# Patient Record
Sex: FEMALE | Race: WHITE | NOT HISPANIC OR LATINO | Employment: FULL TIME | URBAN - METROPOLITAN AREA
[De-identification: names, ages, dates, MRNs, and addresses within clinical notes are randomized per-mention and may not be internally consistent; named-entity substitution may affect disease eponyms.]

---

## 2024-01-24 ENCOUNTER — OFFICE VISIT (OUTPATIENT)
Dept: URGENT CARE | Facility: CLINIC | Age: 54
End: 2024-01-24
Payer: OTHER MISCELLANEOUS

## 2024-01-24 VITALS
OXYGEN SATURATION: 96 % | TEMPERATURE: 98 F | DIASTOLIC BLOOD PRESSURE: 115 MMHG | RESPIRATION RATE: 16 BRPM | HEART RATE: 82 BPM | SYSTOLIC BLOOD PRESSURE: 165 MMHG

## 2024-01-24 DIAGNOSIS — Z02.83 ENCOUNTER FOR DRUG SCREENING: ICD-10-CM

## 2024-01-24 DIAGNOSIS — S59.911A INJURY OF RIGHT FOREARM, INITIAL ENCOUNTER: Primary | ICD-10-CM

## 2024-01-24 DIAGNOSIS — Z02.6 ENCOUNTER RELATED TO WORKER'S COMPENSATION CLAIM: ICD-10-CM
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
## 2024-01-24 LAB
BREATH ALCOHOL: 0
CTP QC/QA: YES
POC 10 PANEL DRUG SCREEN: NEGATIVE

## 2024-01-24 PROCEDURE — 80305 DRUG TEST PRSMV DIR OPT OBS: CPT | Mod: S$GLB,,, | Performed by: FAMILY MEDICINE

## 2024-01-24 PROCEDURE — 73090 X-RAY EXAM OF FOREARM: CPT | Mod: RT,S$GLB,, | Performed by: RADIOLOGY

## 2024-01-24 PROCEDURE — 99203 OFFICE O/P NEW LOW 30 MIN: CPT | Mod: S$GLB,,, | Performed by: FAMILY MEDICINE

## 2024-01-24 PROCEDURE — 82075 ASSAY OF BREATH ETHANOL: CPT | Mod: S$GLB,,, | Performed by: FAMILY MEDICINE

## 2024-01-24 NOTE — PROGRESS NOTES
Subjective:      Patient ID: Alessia Oconnell is a 53 y.o. female.    Chief Complaint: Arm Injury    WC initial Visit.  Pt works for [employer]   Pt presents with trauma to wrist after slipping and falling in shower.  Pain to right wrist with swelling.  Pain goes up arm .  No numbness to fingers  Pain 4/10  Meds None    Arm Injury   The incident occurred less than 1 hour ago. The incident occurred at work. The injury mechanism was a fall. The pain is present in the right wrist. The quality of the pain is described as aching and shooting. The pain does not radiate. The pain is at a severity of 4/10. The pain is moderate. The pain has been Intermittent since the incident. Pertinent negatives include no tingling. The symptoms are aggravated by lifting and movement. She has tried nothing for the symptoms.     ROS  Objective:     Physical Exam   Tenderness over middle third of right forearm.   Able to initiate pronation and supination but with pain  FROM of wrist with referred pain to contused area.  No apparent bruising at this time.   Pt states that she's having some pain referred to upper arm-- suspect likely from splinting to keep forearm still. Normal ROM at shoulder.   Assessment:      1. Injury of right forearm, initial encounter    2. Encounter related to worker's compensation claim    3. Encounter for drug screening    4. Fall, initial encounter      Plan:   Otc instructions given  Relief with sling.            Restrictions:  (duty as tolerated with attnetion not to aggravate.)  No follow-ups on file.    XR FOREARM RIGHT    Result Date: 1/24/2024  EXAMINATION: XR FOREARM RIGHT CLINICAL HISTORY: Unspecified injury of right forearm, initial encounter TECHNIQUE: AP and lateral views of the right forearm were performed. COMPARISON: None FINDINGS: Bones are well mineralized. Overall alignment is within normal limits. No displaced fracture, dislocation or destructive osseous process.  Joint spaces appear  relatively maintained. No subcutaneous emphysema or radiodense retained foreign body.     No acute displaced fracture-dislocation identified. Electronically signed by: Deshawn Mclaughlin MD Date:    01/24/2024 Time:    12:11

## 2024-01-24 NOTE — LETTER
[facility] - [place] [address], [address]  [facility] [address]  Phone: [phone]  Fax: [phone]  [employer] [employer]: [email or web address]    Pt Name: Alessia Oconnell  Injury Date: 01/24/2024   Employee ID: [id number] Date of First Treatment: 01/24/2024   Company: [employer] to [id number]      Appointment Time: 10:15 AM Arrived: 1040   Provider: Cresencio Mcknight MD Time Out:1205     Office Treatment:   1. Injury of right forearm, initial encounter    2. Encounter related to worker's compensation claim    3. Encounter for drug screening    4. Injury of right wrist, initial encounter    5. Fall, initial encounter               Restrictions:  (duty as tolerated with attnetion not to aggravate.)     Return Appointment: 1/31 or sooner if needed     If Rx a medication that can be sedating, DO NOT TAKE DURING YOUR WORK DAY.    Some OTC measures to help in recovery(if no allergies to, renal issues or pregnant):  Tylenol 325mg 3x per day  Ibuprofen 400mg 3x per day OR Aleve regular strength one tablet 2x per day  Take Pepcid 20mg BID  If applicable or discussed: Magnesium OTC daily; Topical Voltaren Gel; Lidocaine patches  Massage area if possible  Resting of the injured area  Ice for ankle, wrist or elbow injury  Elevation of the injured area if applicable  Heating pad for muscle injury  Stretching/ROM exercises as described in clinic.   ** BE ADVISED: You should be in regular contact with your W/C  to know the status of your claim and /or (if any)pending referrals**

## 2024-02-01 ENCOUNTER — TELEPHONE (OUTPATIENT)
Dept: URGENT CARE | Facility: CLINIC | Age: 54
End: 2024-02-01
Payer: OTHER MISCELLANEOUS

## 2024-02-01 NOTE — TELEPHONE ENCOUNTER
Called patient in reference to her missed [facility] and there was no answer, I received the patients voicemail, left a message for the patient and the reason for the call.  EVAN